# Patient Record
Sex: MALE | Race: BLACK OR AFRICAN AMERICAN | NOT HISPANIC OR LATINO | URBAN - METROPOLITAN AREA
[De-identification: names, ages, dates, MRNs, and addresses within clinical notes are randomized per-mention and may not be internally consistent; named-entity substitution may affect disease eponyms.]

---

## 2020-07-18 ENCOUNTER — EMERGENCY (EMERGENCY)
Facility: HOSPITAL | Age: 65
LOS: 1 days | Discharge: HOME | End: 2020-07-18
Admitting: EMERGENCY MEDICINE
Payer: COMMERCIAL

## 2020-07-18 VITALS
OXYGEN SATURATION: 100 % | HEART RATE: 78 BPM | RESPIRATION RATE: 16 BRPM | SYSTOLIC BLOOD PRESSURE: 161 MMHG | DIASTOLIC BLOOD PRESSURE: 93 MMHG | TEMPERATURE: 98 F

## 2020-07-18 VITALS
SYSTOLIC BLOOD PRESSURE: 158 MMHG | DIASTOLIC BLOOD PRESSURE: 86 MMHG | OXYGEN SATURATION: 100 % | RESPIRATION RATE: 16 BRPM | HEART RATE: 64 BPM

## 2020-07-18 DIAGNOSIS — S82.891D OTHER FRACTURE OF RIGHT LOWER LEG, SUBSEQUENT ENCOUNTER FOR CLOSED FRACTURE WITH ROUTINE HEALING: Chronic | ICD-10-CM

## 2020-07-18 DIAGNOSIS — Z95.5 PRESENCE OF CORONARY ANGIOPLASTY IMPLANT AND GRAFT: Chronic | ICD-10-CM

## 2020-07-18 LAB — SARS-COV-2 RNA SPEC QL NAA+PROBE: SIGNIFICANT CHANGE UP

## 2020-07-18 PROCEDURE — 99285 EMERGENCY DEPT VISIT HI MDM: CPT

## 2020-07-18 RX ORDER — HALOPERIDOL DECANOATE 100 MG/ML
5 INJECTION INTRAMUSCULAR ONCE
Refills: 0 | Status: COMPLETED | OUTPATIENT
Start: 2020-07-18 | End: 2020-07-18

## 2020-07-18 RX ADMIN — Medication 1 MILLIGRAM(S): at 16:16

## 2020-07-18 RX ADMIN — HALOPERIDOL DECANOATE 5 MILLIGRAM(S): 100 INJECTION INTRAMUSCULAR at 16:16

## 2020-07-18 NOTE — ED PROVIDER NOTE - PMH
Asthma  Asthma, persistent, severity to be determined  Benign prostatic hyperplasia, presence of lower urinary tract symptoms unspecified, unspecified morphology    Essential hypertension  HTN (hypertension)  History of diseases of blood and blood-forming organs  H/O sickle cell anemia ( States Sickle Cell Trait)  History of disorder of nervous system and sense organs  History of seizures  Old myocardial infarction  H/O acute myocardial infarction  Schizophrenia
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
## 2020-07-18 NOTE — ED ADULT NURSE NOTE - NSIMPLEMENTINTERV_GEN_ALL_ED
Implemented All Universal Safety Interventions:  [place] to call system. Call bell, personal items and telephone within reach. Instruct patient to call for assistance. Room bathroom lighting operational. Non-slip footwear when patient is off stretcher. Physically safe environment: no spills, clutter or unnecessary equipment. Stretcher in lowest position, wheels locked, appropriate side rails in place. Implemented All Fall Risk Interventions:  [place] to call system. Call bell, personal items and telephone within reach. Instruct patient to call for assistance. Room bathroom lighting operational. Non-slip footwear when patient is off stretcher. Physically safe environment: no spills, clutter or unnecessary equipment. Stretcher in lowest position, wheels locked, appropriate side rails in place. Provide visual cue, wrist band, yellow gown, etc. Monitor gait and stability. Monitor for mental status changes and reorient to person, place, and time. Review medications for side effects contributing to fall risk. Reinforce activity limits and safety measures with patient and family.

## 2020-07-18 NOTE — ED BEHAVIORAL HEALTH NOTE - BEHAVIORAL HEALTH NOTE
ADDIE informed by BRODY Ascencio that patient is cleared for discharge and he has informed [facility] staff. ADDIE contacted [facility] ([phone]) and spoke with Ms. Valdez, Nursing Supervisor. Ms. Valdez in agreement with patient return to nursing home. Ms. Valdez confirms patient can travel independently via AMBULANCE. Confirms facility address as [address] [place] [address]. Clinical provider in agreement with travel back to facility via ambulance.
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
## 2020-07-18 NOTE — ED ADULT NURSE REASSESSMENT NOTE - NS ED NURSE REASSESS COMMENT FT1
pt calm & cooperative denies si/hi/avh presently, pt tolerated breakfast assisted to bathroom safety & comfort measures  maintained eval on going.

## 2020-07-18 NOTE — ED PROVIDER NOTE - PATIENT PORTAL LINK FT
You can access the FollowMyHealth Patient Portal offered by [facility] by registering at the following website: [email or web address]. By joining [facility]’s FollowMyHealth portal, you will also be able to view your health information using other applications (apps) compatible with our system.

## 2020-07-18 NOTE — ED PROVIDER NOTE - CLINICAL SUMMARY MEDICAL DECISION MAKING FREE TEXT BOX
Medical evaluation performed. There is no clinical evidence of intoxication or any acute medical problem requiring immediate intervention. 65 y/o  M hx  Schizophrenia, HTN, MI, BPH, Anemia   Covid  Testing. Behaviour likely related to schizophrenia.  Discuss  plan with covering NP and nursing supervisor   to continue Haldol 1 mg BID and follow up with Psychiatrist.  Medical evaluation performed. There is no clinical evidence of intoxication or any acute medical problem requiring immediate intervention.  D/C to Facility via  EMS.

## 2020-07-18 NOTE — ED PROVIDER NOTE - PROGRESS NOTE DETAILS
Silva NP -  As per witness, Patient walked to bathroom , but due to unsteady gait  braced self on wall and glide himself to floor . No evidence of physical injuries, broken skin or deformities.  Physical examination unremarkable.  Denies  pain or  hitting pain.

## 2020-07-18 NOTE — ED ADULT NURSE REASSESSMENT NOTE - NS ED NURSE REASSESS COMMENT FT1
Pt cleared for d/c by BRODY Ascencio. Pt calm and cooperative. Denies S/I H/I A/H V/H. Pt will  be transported to nursing home via EMS. Pt received discharge instructions.
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
## 2020-07-18 NOTE — ED PROVIDER NOTE - NSFOLLOWUPCLINICS_GEN_ALL_ED_FT
[facility]  Behavioral Health  [id number] [place] [address]  Phone: [phone]  Fax:   Follow Up Time:
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
## 2020-07-18 NOTE — ED PROVIDER NOTE - CPE EDP [facility] NORM
07/14/20     Kyler Jo  [address]  [place] [address]              To whom it may concern:    The above patient was seen virtually for illness, possible due to COVID-19.  His test is currently pending.  As such, the patient must quarantine, and may not return to work until test results. Further instructions pending results.   Please excuse the patient's absence for the appropriate amount of time.     Sincerely,        Veronica Raya PA-C PA-C    [place] [id number] [facility] [id number] [phone]       - - -
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
